# Patient Record
(demographics unavailable — no encounter records)

---

## 2018-01-01 NOTE — DS
Prenatal Information: 


   Previous Pregnancy/Births





Maternal Age                     27


Grav                             1


Para                             2


SAB                              0


IEA                              0


LC                               1


Maternal Blood Type and Rh       O Positive





   Testing Needs/Results





Gestational Age in Weeks and     39 Weeks and 1 Days


Days                             


Determined By                    Early Ultrasound


Violence or Abuse During this    No


Pregnancy                        


Feeding Plan                     Breast


Planned Infant Care Provider     [facility]


Post-Discharge                   


Serology/RPR Result              Non-Reactive


Rubella Result                   Non-Immune


HBsAg Result                     Negative


HIV Result                       Negative


GBS Culture Result               Negative








   Significant Medical History





Hx Diabetes                      No


Hx Thyroid Disease               No


Hx Hyperthyroidism               No


Hx Hypothyroidism                No


Hx Pregnancy Induced             No


Hypertension                     


Hx Hypertension                  Yes: NO LONGER TREATED


Hx Depression                    No


Hx Postpartum Depression         No


Hx Anxiety                       No


Other Psychiatric Issues/        No


Disorders                        


Hx Asthma                        No


Hx Kidney Infection              No


Hx  Section              No





   Tobacco/Alcohol/Substance Use





Smoking Status (MU)              Never Smoked Tobacco


Have You Smoked in the Last      Yes


Year                             


Household Exposure               No


Alcohol Use                      None


Substance Use Type               None





   Delivery Information/Events of Note





Date of Birth [A]                18


Time of Birth [A]                15:45


Delivery Method [A]              Spontaneous Vaginal


Labor [A]                        Induced


Did Patient attempt ? [A]    N/A, No Previous C-Sectio


Amniotic Fluid [A]               Clear


Anesthesia/Analgesia [A]         CEI for Labor


Level of Nursery                 Regular/Bedside


Delivery Events of Note          Pitocin During Labor

















Delivery Events


Date of Birth: 18


Time of Birth: 15:45


Apgar Score 1 Minute: 9


Apgar Score 5 Minutes: 9


Gestational Age Weeks: 39


Gestational Age Days: 1


Delivery Type: Vaginal


Amniotic Fluid: Clear


Intrapartal Antibiotics Indicated: None Apply


Other GBS Status Detail: GBS Negative This Pregnancy


ROM Length: ROM < 18 Hours


Antibiotic Treatment: No Antibx, or ANY Antibx Given < 2hrs Prior to Delivery


Hepatitis B Vaccine: Given Within 12 Hours


Immunoglobulin Given: No


 Drug Withdrawal Risk: None Apply


Hepatitis B Status/Risk: Mother HBsAg NEGATIVE With No New Risk Factors


Maternal Consent: Mother CONSENTS To Infant Hepatitis Vaccine +/- HBIG


Method of Feeding: Breast feeding


Stool Passed: Yes


Stools in Past 24 Hours: 3


Voiding: Yes


Times Voided in Past 24 Hours: 4





Measurements


Current Weight: 7 lb 5.991 oz


Weight in lbs and ozs: 7 lbs and 6 oz


Weight Yesterday: 7 lb 7.12 oz


Weight Gain/Loss Since Last Weight In Grams: 32.0 Loss


Birth Weight: 7 lb 7.12 oz


Birthweight in lbs and ozs: 7 lbs and 7 oz


% Weight Gain/Loss from Birth Weight: 1% Loss


Length: 19.2 in


Head Circumference in inches: 13.5





Vitals


Vital Signs: 


 Vital Signs











  18





  17:03 17:57 19:38


 


Temperature 99.0 F 99.0 F 99.2 F


 


Pulse Rate 135 155 148


 


Respiratory 40 56 48





Rate   














  18





  00:07 04:18 07:43


 


Temperature 98.5 F 98.3 F 99.6 F


 


Pulse Rate 140 140 148


 


Respiratory 44 42 46





Rate   














  18





  13:00 15:52 16:12


 


Temperature 98.3 F 98.5 F 98.8 F


 


Pulse Rate 136 140 142


 


Respiratory 40 40 36





Rate   














 Physical Exam


General Appearance: Alert, Active


Skin Color: Normal


Level of Distress: No Distress


Neck: Normal Tone


Respiratory Effort: Normal


Respiratory Rate: Normal


Auscultation: Bilateral Good Air Exchange


Breath Sounds: NL Both Lungs


Rhythm: Regular


Abnormal Heart Sounds: No Murmurs, No S3, No S4


Umbilicus Assessment: Yes Normal


Abdomen: Normal


Abdomen Palpation: Liver Normal, Spleen Normal


Clavicles: Normal


Left Hip: Normal ROM


Right Hip: Normal ROM


Skin Texture: Smooth, Soft


Skin Appearance: No Abnormalities


Neuro: Normal: [place], Sucking, Muscle Tone


Cranial Nerve Exam: Cranial N. II-XII Normal





Medications


Home Medications: 


 Home Medications











 Medication  Instructions  Recorded  Confirmed  Type


 


NK [No Home Medications Reported]  18 History











Inpatient Medications: 


 Medications





Dextrose (Glutose Oral Nicu*)  0 ml BUCCAL .SEE MD INSTRUCTIONS PRN; Protocol


   PRN Reason: ASYMTOMATIC HYPOGLYCEMIA











Results/Investigations


Transcutaneous Bilirubin Result: 4.6


Time Obtained: 16:12


Age in Hours: 24


Risk Zone: Low Risk


Major Jaundice Risk Factors: None


Minor Jaundice Risk Factors: Mother > 24 yrs old


CCHD Screen: Passed


Lab Results: 


 











  18





  15:48 15:48 15:48


 


Total Bilirubin  2.20  


 


RPR   Nonreactive 


 


Blood Type    A Positive


 


Direct Antiglob Test    Negative














Hospital Course


Hearing Screen: Passed Both


Left Ear: Passed, TEOAE


Right Ear: Passed, TEOAE


Date Given: 18


[place] Scre[place]: Done





Assessment





- Assessment


Condition at Discharge: Stable


Discharge Disposition: Home


Diagnosis at Discharge: Term AGA female


Assessment Comments: 





Term AGA female.  Experienced breastfeeding mom.  Mom O+, baby A+, FERNANDO 

negative.  weight 1% below birthweight.  vital signs stable and within normal 

limits.  voiding and stooling.  Exam normal.  tcb = 4.6 at 24 hours = low risk 

zone.  Passed CCHD and Hearing.   screen done.  Hep B given.  Will have 

follow up tomorrow as discharged at 24 hours. 





Plan





- Follow Up Care


Appointment Status: Scheduled





- Anticipatory Guidance/Instruction


Provided Guidance to: Mother, Father


Guidance and Instruction: hazards of second hand smoke, signs of illness, CPR 

training, medication administration, feeding schedule/plan, use of car seat, 

signs of jaundice, safety in home, contact physician on call, sleeping position

, umbilicus care, limit exposure to others

## 2018-01-01 NOTE — HP
Information from Mother's Record: 


   Previous Pregnancy/Births





Maternal Age                     27


Grav                             1


Para                             2


SAB                              0


IEA                              0


LC                               1


Maternal Blood Type and Rh       O Positive





   Testing Needs/Results





Gestational Age in Weeks and     39 Weeks and 1 Days


Days                             


Determined By                    Early Ultrasound


Violence or Abuse During this    No


Pregnancy                        


Feeding Plan                     Breast


Planned Infant Care Provider     [facility]


Post-Discharge                   


Serology/RPR Result              Non-Reactive


Rubella Result                   Non-Immune


HBsAg Result                     Negative


HIV Result                       Negative


GBS Culture Result               Negative








   Significant Medical History





Hx Diabetes                      No


Hx Thyroid Disease               No


Hx Hyperthyroidism               No


Hx Hypothyroidism                No


Hx Pregnancy Induced             No


Hypertension                     


Hx Hypertension                  Yes: NO LONGER TREATED


Hx Depression                    No


Hx Postpartum Depression         No


Hx Anxiety                       No


Other Psychiatric Issues/        No


Disorders                        


Hx Asthma                        No


Hx Kidney Infection              No


Hx  Section              No





   Tobacco/Alcohol/Substance Use





Smoking Status (MU)              Never Smoked Tobacco


Have You Smoked in the Last      Yes


Year                             


Household Exposure               No


Alcohol Use                      None


Substance Use Type               None





   Delivery Information/Events of Note





Date of Birth [A]                18


Time of Birth [A]                15:45


Delivery Method [A]              Spontaneous Vaginal


Labor [A]                        Induced


Did Patient attempt ? [A]    N/A, No Previous C-Sectio


Amniotic Fluid [A]               Clear


Anesthesia/Analgesia [A]         CEI for Labor


Level of Nursery                 Regular/Bedside


Delivery Events of Note          Pitocin During Labor

















Delivery Events


Date of Birth: 18


Time of Birth: 15:45


Apgar Score 1 Minute: 9


Apgar Score 5 Minutes: 9


Gestational Age Weeks: 39


Gestational Age Days: 1


Delivery Type: Vaginal


Amniotic Fluid: Clear


Intrapartal Antibiotics Indicated: None Apply


Other GBS Status Detail: GBS Negative This Pregnancy


ROM Length: ROM < 18 Hours


Antibiotic Treatment: No Antibx, or ANY Antibx Given < 2hrs Prior to Delivery


Hepatitis B Vaccine: Given Within 12 Hours


Immunoglobulin Given: No


 Drug Withdrawal Risk: None Apply


Hepatitis B Status/Risk: Mother HBsAg NEGATIVE With No New Risk Factors


Maternal Consent: Mother CONSENTS To Infant Hepatitis Vaccine +/- HBIG





Hypoglycemia Assessment


Hypoglycemia Risk - High: None


Hypoglycemia Symptoms: None





Measurements


Current Weight: 7 lb 5.991 oz


Weight in lbs and ozs: 7 lbs and 6 oz


Weight Yesterday: 7 lb 7.12 oz


Weight Gain/Loss Since Last Weight In Grams: 32.0 Loss


Birth Weight: 7 lb 7.12 oz


Birthweight in lbs and ozs: 7 lbs and 7 oz


% Weight Gain/Loss from Birth Weight: 1% Loss


Length: 19.2 in


Head Circumference in inches: 13.5





Vitals


Vital Signs: 


 Vital Signs











  18





  16:15 17:03 17:57


 


Temperature 98.0 F 99.0 F 99.0 F


 


Pulse Rate 140 135 155


 


Respiratory 50 40 56





Rate   














  18





  19:38 00:07 04:18


 


Temperature 99.2 F 98.5 F 98.3 F


 


Pulse Rate 148 140 140


 


Respiratory 48 44 42





Rate   














  18





  07:43


 


Temperature 99.6 F


 


Pulse Rate 148


 


Respiratory 46





Rate 














 Physical Exam


General Appearance: Alert, Active


Skin Color: Normal


Level of Distress: No Distress


Nutritional Status: AGA


Cranial Features: Normal head shape, Symmetric facial features, Normal 

fontanelles


Eyes: Bilateral Normal, Bilateral Red Reflex


Ears: Symmetrical, Normal Position, Canals Patent


Oropharynx: Normal: Lips, Mouth, Gums, Uvula


Neck: Normal Tone


Respiratory Effort: Normal


Respiratory Rate: Normal


Chest Appearance: Normal, [place] Breast 3-4 mm Size, Symmetrical


Auscultation: Bilateral Good Air Exchange


Breath Sounds: NL Both Lungs


Location of Apical Pulse: Normal


Rhythm: Regular


Heart Sounds: Normal: S1, S2


Abnormal Heart Sounds: No Murmurs, No S3, No S4


Brachial Pulses: Bilateral Normal


Femoral Pulses: Bilateral Normal


Umbilicus Assessment: Yes Normal


Abdomen: Normal


Abdomen Palpation: Liver Normal, Spleen Normal


Hernia: None


Anus: Patent


Location of Anus: Normal


Genital Appearance: Female


Enlarged Nodes: None


External Genitalia: Normal: Labia, Clitoris, Introitus


Urethral Meatus: Normal


Vagina: Normal for Gestational Age


Clavicles: Normal


Arms: 2 Symmetrical Extremities, Full Range of Motion


Hands: 2 Hands, Symmetrical, 5 Fingers on Each Hand, Full Range of Motion


Left Hip: Normal ROM


Right Hip: Normal ROM


Legs: 2 Symmetrical Extremities, Full Range of Motion


Feet: 2 Feet, Symmetrical, Creases on 2/3 of Soles, Full Range of Motion


Spine: Normal


Skin Texture: Smooth, Soft


Skin Appearance: No Abnormalities


Neuro: Normal: [place], Sucking, Muscle Tone


Cranial Nerve Exam: Cranial N. II-XII Normal


Deep Tendon Reflexes: Normal: Bicep, Knee, Ankle





Medications


Home Medications: 


 Home Medications











 Medication  Instructions  Recorded  Confirmed  Type


 


NK [No Home Medications Reported]  18 History











Inpatient Medications: 


 Medications





Dextrose (Glutose Oral Nicu*)  0 ml BUCCAL .SEE MD INSTRUCTIONS PRN; Protocol


   PRN Reason: ASYMTOMATIC HYPOGLYCEMIA











Results/Investigations


Age in Hours: 16


CCHD Screen: Pending


Lab Results: 


 











  18





  15:48 15:48


 


Total Bilirubin  2.20 


 


Blood Type   A Positive


 


Direct Antiglob Test   Negative














Assessment





- Status


Status: Full-term, AGA





Plan of Care


Provided Guidance to: Mother, Father


Guidance and Instruction: hazards of second hand smoke, signs of illness, CPR 

training, medication administration, feeding schedule/plan, use of car seat, 

signs of jaundice, safety in home, contact physician on call, sleeping position

, umbilicus care, limit exposure to others